# Patient Record
Sex: MALE | Race: BLACK OR AFRICAN AMERICAN | NOT HISPANIC OR LATINO | Employment: FULL TIME | URBAN - METROPOLITAN AREA
[De-identification: names, ages, dates, MRNs, and addresses within clinical notes are randomized per-mention and may not be internally consistent; named-entity substitution may affect disease eponyms.]

---

## 2024-08-29 ENCOUNTER — V-VISIT (OUTPATIENT)
Dept: URGENT CARE | Age: 49
End: 2024-08-29

## 2024-08-29 VITALS
RESPIRATION RATE: 18 BRPM | TEMPERATURE: 97.6 F | HEIGHT: 69 IN | DIASTOLIC BLOOD PRESSURE: 81 MMHG | SYSTOLIC BLOOD PRESSURE: 114 MMHG | WEIGHT: 165 LBS | HEART RATE: 84 BPM | OXYGEN SATURATION: 99 % | BODY MASS INDEX: 24.44 KG/M2

## 2024-08-29 DIAGNOSIS — K52.9 ACUTE GASTROENTERITIS: Primary | ICD-10-CM

## 2024-08-29 PROCEDURE — 99203 OFFICE O/P NEW LOW 30 MIN: CPT | Performed by: NURSE PRACTITIONER

## 2024-08-29 ASSESSMENT — PAIN SCALES - GENERAL: PAINLEVEL: 0

## 2024-08-29 ASSESSMENT — ENCOUNTER SYMPTOMS
FEVER: 0
VOMITING: 0
DIARRHEA: 1

## 2025-01-07 ENCOUNTER — APPOINTMENT (OUTPATIENT)
Dept: GENERAL RADIOLOGY | Age: 50
End: 2025-01-07
Attending: EMERGENCY MEDICINE
Payer: COMMERCIAL

## 2025-01-07 ENCOUNTER — HOSPITAL ENCOUNTER (EMERGENCY)
Age: 50
Discharge: HOME | End: 2025-01-07
Attending: EMERGENCY MEDICINE
Payer: COMMERCIAL

## 2025-01-07 VITALS
TEMPERATURE: 99 F | HEART RATE: 79 BPM | SYSTOLIC BLOOD PRESSURE: 116 MMHG | OXYGEN SATURATION: 98 % | DIASTOLIC BLOOD PRESSURE: 61 MMHG | BODY MASS INDEX: 24.44 KG/M2 | HEIGHT: 69 IN | RESPIRATION RATE: 20 BRPM | WEIGHT: 165 LBS

## 2025-01-07 DIAGNOSIS — J11.1 INFLUENZA: Primary | ICD-10-CM

## 2025-01-07 LAB
POCT INFLUENZA A: POSITIVE
POCT INFLUENZA B: NEGATIVE
SARS-COV-2 RNA RESP QL NAA+PROBE: NOT DETECTED

## 2025-01-07 PROCEDURE — 99284 EMERGENCY DEPT VISIT MOD MDM: CPT

## 2025-01-07 PROCEDURE — 71046 X-RAY EXAM CHEST 2 VIEWS: CPT | Performed by: EMERGENCY MEDICINE

## 2025-01-07 PROCEDURE — 87502 INFLUENZA DNA AMP PROBE: CPT | Performed by: EMERGENCY MEDICINE

## 2025-01-07 RX ORDER — BENZONATATE 100 MG/1
100 CAPSULE ORAL 3 TIMES DAILY PRN
Qty: 20 CAPSULE | Refills: 0 | Status: SHIPPED | OUTPATIENT
Start: 2025-01-07

## 2025-01-07 RX ORDER — ACETAMINOPHEN 500 MG
1000 TABLET ORAL ONCE
Status: COMPLETED | OUTPATIENT
Start: 2025-01-07 | End: 2025-01-07

## 2025-01-07 RX ORDER — OSELTAMIVIR PHOSPHATE 75 MG/1
75 CAPSULE ORAL 2 TIMES DAILY
Qty: 10 CAPSULE | Refills: 0 | Status: SHIPPED | OUTPATIENT
Start: 2025-01-07 | End: 2025-01-12

## 2025-01-07 NOTE — ED PROVIDER NOTES
Patient Seen in: [facility] In [place]      History     Chief Complaint   Patient presents with    Cough/URI     Stated Complaint: uri sx, back pain    Subjective:   HPI      Patient is a 49-year-old male who does smoke presents to the emergency room with a history of cough and cold symptoms last couple days.  The patient had fever, cough, runny nose, and back pain associated with symptoms.  The patient has had no history of any vomiting.  The patient has had diminished appetite at home.  The patient's children were sick with similar symptoms recently.  The patient denies history of any recent antibiotic use.  The patient did not get a flu vaccine this year patient's cough has been primarily dry per patient.    Objective:     History reviewed. No pertinent past medical history.           History reviewed. No pertinent surgical history.             Social History     Socioeconomic History    Marital status: Significant Other   Tobacco Use    Smoking status: Every Day     Types: Cigarettes    Smokeless tobacco: Never   Vaping Use    Vaping status: Never Used   Substance and Sexual Activity    Alcohol use: Yes     Comment: 12 pack per week    Drug use: Yes     Types: Cannabis     Comment: once a month     Social Drivers of Health     Financial Resource Strain: Not on File (5/8/2023)    Received from [facility]    Financial Resource Strain     Financial Resource Strain: 0   Food Insecurity: Not on File (9/26/2024)    Received from [facility]    Food Insecurity     Food: 0   Transportation Needs: Not on File (5/8/2023)    Received from [facility]    Transportation Needs     Transportation: 0   Physical Activity: Not on File (5/8/2023)    Received from [facility]    Physical Activity     Physical Activity: 0   Stress: Not on File (5/8/2023)    Received from [facility]    Stress     Stress: 0   Social Connections: Not on File (9/14/2024)    Received from [facility]    Social Connections     Connectedness: 0   Housing Stability:  Not on File (2023)    Received from [facility] Stability     Housin                  Physical Exam     ED Triage Vitals [25 0638]   /86   Pulse 96   Resp 18   Temp 100.3 °F (37.9 °C)   Temp src Oral   SpO2 99 %   O2 Device None (Room air)       Current Vitals:   Vital Signs  BP: 136/86  Pulse: 96  Resp: 18  Temp: 100.3 °F (37.9 °C)  Temp src: Oral    Oxygen Therapy  SpO2: 99 %  O2 Device: None (Room air)        Physical Exam  GENERAL: Well-developed, well-nourished male sitting up breathing easily in no apparent distress.  Patient is nontoxic in appearance.  HEENT: Head is normocephalic, atraumatic. Pupils are 4 mm equally round and reactive to light. Oropharynx is clear. Mucous membranes are moist.  NECK:  No stridor.  LUNGS: Clear to auscultation bilaterally with no wheeze. There is good equal air entry bilaterally.  HEART: Regular rate and rhythm. Normal S1, S2 no S3, or S4. No murmur.  BACK: There is no focal tenderness to palpation throughout the thoracic or lumbar spinous processes.  There is no overlying ecchymosis or rash appreciated.    EXTREMITIES: There is no cyanosis, clubbing, or edema appreciated. Pulses are 2+ and equal in all 4 extremities.  NEURO: Patient is awake, alert and oriented to time place and person. Motor strength is 5 over 5 in all 4 extremities. There are no gross motor or sensory deficits appreciated.  Patient answering all questions appropriately.          ED Course     Labs Reviewed   POCT FLU TEST - Abnormal; Notable for the following components:       Result Value    POCT INFLUENZA A Positive (*)     All other components within normal limits    Narrative:     This assay is a rapid molecular in vitro test utilizing nucleic acid amplification of influenza A and B viral RNA.   RAPID SARS-COV-2 BY PCR - Normal     I personally reviewed the patient's chest x-ray images and my individual interpretation shows no evidence of any acute infiltrate.  I also reviewed  official radiology report which showed results as noted below.       XR CHEST PA + LAT CHEST (CPT=71046)    Result Date: 1/7/2025  CONCLUSION:  Normal cardiac and mediastinal contours.  No pulmonary edema or focal airspace consolidation.  The pleural spaces are clear.  Regional osseous structures are normal.    LOCATION:  [id number]   Dictated by (CST): Hiral Guerra MD on 1/07/2025 at 7:36 AM     Finalized by (CST): Hiral Guerra MD on 1/07/2025 at 7:37 AM             MDM      Patient with chest x-ray as noted above.  Differential diagnosis considered myself includes was not limited to acute pneumonia, acute COVID infection, acute influenza.  Patient with evidence of influenza will be treated with Tamiflu and Tessalon Perles for home instructions to encourage fluids and rest at home instructed to stop smoking and to return to the ER immediately if symptoms worsen or if any other problems arise.  Patient discharged to home at this time.        Medical Decision Making      Disposition and Plan     Clinical Impression:  1. Influenza         Disposition:  Discharge  1/7/2025  7:41 am    Follow-up:  Cortez Marcelo MD  [address] [facility] [address]  [phone]    Follow up in 2 day(s)  or call your md for follow up this week          Medications Prescribed:  Current Discharge Medication List        START taking these medications    Details   oseltamivir (TAMIFLU) 75 MG Oral Cap Take 1 capsule (75 mg total) by mouth 2 (two) times daily for 5 days.  Qty: 10 capsule, Refills: 0      benzonatate 100 MG Oral Cap Take 1 capsule (100 mg total) by mouth 3 (three) times daily as needed for cough.  Qty: 20 capsule, Refills: 0                 Supplementary Documentation:

## 2025-01-07 NOTE — DISCHARGE INSTRUCTIONS
Motrin or tylenol for symptoms at home  Encourage fluids at home  Please stop smoking at home  Return to the ER if symptoms worsen or if any other problems arise

## (undated) NOTE — LETTER
January 7, 2025    Patient: Barney French   Date of Visit: 1/7/2025       To Whom It May Concern:    Barney French was seen and treated in our emergency department on 1/7/2025. He can return to work  when fever free for 24 hours without fever reducing medication.    If you have any questions or concerns, please don't hesitate to call.       Encounter Provider(s):    Jarad Sabillon MD